# Patient Record
Sex: FEMALE | Race: AMERICAN INDIAN OR ALASKA NATIVE
[De-identification: names, ages, dates, MRNs, and addresses within clinical notes are randomized per-mention and may not be internally consistent; named-entity substitution may affect disease eponyms.]

---

## 2018-03-26 ENCOUNTER — HOSPITAL ENCOUNTER (OUTPATIENT)
Dept: HOSPITAL 56 - MW.SDS | Age: 68
Discharge: HOME | End: 2018-03-26
Attending: SURGERY
Payer: MEDICARE

## 2018-03-26 VITALS — DIASTOLIC BLOOD PRESSURE: 61 MMHG | SYSTOLIC BLOOD PRESSURE: 136 MMHG

## 2018-03-26 DIAGNOSIS — Z88.8: ICD-10-CM

## 2018-03-26 DIAGNOSIS — K21.9: ICD-10-CM

## 2018-03-26 DIAGNOSIS — Z79.899: ICD-10-CM

## 2018-03-26 DIAGNOSIS — K64.8: Primary | ICD-10-CM

## 2018-03-26 DIAGNOSIS — I10: ICD-10-CM

## 2018-03-26 DIAGNOSIS — Z86.010: ICD-10-CM

## 2018-03-26 DIAGNOSIS — Z91.030: ICD-10-CM

## 2018-03-26 DIAGNOSIS — E66.9: ICD-10-CM

## 2018-03-26 PROCEDURE — 45378 DIAGNOSTIC COLONOSCOPY: CPT

## 2018-03-26 NOTE — OR
SURGEON:

Jacobo Spencer M.D.

 

DATE OF PROCEDURE:  03/26/2018

 

OPERATION PERFORMED:

Colonoscopy.

 

ASSISTANT:

Dr. Palacios, PGY3.

 

ANESTHESIA:

MAC.

 

ASA CLASSIFICATION:

III.

 

PREOPERATIVE DIAGNOSES:

1. Intermittent rectal bleeding.

2. Personal history of colon polyps.

 

POSTOPERATIVE DIAGNOSES:

1. No evidence of neoplasia.

2. Internal hemorrhoids.

 

DESCRIPTION OF PROCEDURE:

The patient was taken to the endoscopy room and positioned on the endoscopy

table in the left lateral decubitus position.  Time-out was called for

appropriate identification of the patient and procedure.  Monitored anesthesia

care was provided.  The colonoscope was inserted into the rectum and advanced

with minimal difficulty to the cecum where the colonoscope was retroflexed to

visualize the ascending colon from below.  The colonoscope was then straightened

and slowly withdrawn.  The cecum, ascending colon, hepatic flexure, transverse

colon, splenic flexure, descending colon, sigmoid colon, and rectum were very

well visualized.  No tumors, polyps, diverticula, or angiodysplastic changes

were noted anywhere in the lower gastrointestinal tract.  Once the colonoscope

was withdrawn to the rectum, it was retroflexed to visualize the anal orifice

from above.  No tumors or polyps were seen.  The patient does have internal

hemorrhoids that are not acutely bleeding.

 

Colonoscope was then straightened, the rectum aspirated, and the colonoscope

removed.

 

The patient tolerated the procedure well and was taken to recovery room in

stable condition.

 

 

ADRY ANTHONY

DD:  03/26/2018 12:31:47

DT:  03/26/2018 12:52:30

Job #:  [id number]

## 2018-03-26 NOTE — PCM.PREANE
Preanesthetic Assessment





- Anesthesia/Transfusion/Family Hx


Anesthesia History: Prior Anesthesia Reaction


Other Type of Anesthesia Reaction Comment: Sick, vomit post anesthesia, my 

mother '70's heart stopped w/anesthesia


Family History of Anesthesia Reaction: No


Transfusion History: Prior Transfusion Without Reaction


Intubation History: Unknown





- Review of Systems


General: No Symptoms


Pulmonary: No Symptoms


Cardiovascular: No Symptoms


Gastrointestinal: Other (heme-positive stools)


Neurological: No Symptoms


Other: Reports: None





- Physical Assessment


Height: 1.6 m


Weight: 86.183 kg


ASA Class: 3


Mental Status: Alert & Oriented x3


Airway Class: Mallampati = 2


Dentition: Reports: Edentulous (upper)


Thyro-Mental Finger Breadths: 3


Mouth Opening Finger Breadths: 3


ROM/Head Extension: Full


Lungs: Clear to Auscultation, Normal Respiratory Effort


Cardiovascular: Regular Rate, Regular Rhythm





- Allergies


Allergies/Adverse Reactions: 


 Allergies











Allergy/AdvReac Type Severity Reaction Status Date / Time


 


acetaminophen Allergy  Vomiting Verified 18 14:38





[From Darvocet-N]     


 


codeine Allergy  Vomiting Verified 18 14:38


 


hydrocodone Allergy  Hallucinati Verified 18 14:38





   ons  


 


oxycodone Allergy  Hallucinati Verified 18 14:38





   ons  


 


propoxyphene napsylate Allergy  Vomiting Verified 18 14:38





[From Darvocet-N]     


 


venom-honey bee Allergy  Anaphylactic Verified 18 14:38





[bee venom (honey bee)]   Shock  














- Blood


Blood Available: No





- Anesthesia Plan


Pre-Op Medication Ordered: None





- Acknowledgements


Anesthesia Type Planned: MAC


Pt an Appropriate Candidate for the Planned Anesthesia: Yes


Alternatives and Risks of Anesthesia Discussed w Pt/Guardian: Yes


Pt/Guardian Understands and Agrees with Anesthesia Plan: Yes





PreAnesthesia Questionnaire


HEENT History: Reports: Cataract, Hard of Hearing, Other (See Below)


Other HEENT History: using reading glasses,  has bilateral heariing aides


Cardiovascular History: Reports: High Cholesterol, Hypertension


Gastrointestinal History: Reports: Colon Polyp, Diverticulosis, GERD, Hiatal 

Hernia


Genitourinary History: Reports: Other (See Below)


Other Genitourinary History: HX: UTI's


OB/GYN History: Reports: Pregnancy


Musculoskeletal History: Reports: Fracture


Other Musculoskeletal History: hx of fx left arm and toe,  has left leg pain


Neurological History: Reports: Migraines, Other (See Below)


Other Neuro History: hx of [place] Palsy


Psychiatric History: Reports: Other (See Below)


Other Psychiatric History: Claustrophobia


Endocrine/Metabolic History: Reports: Obesity/BMI 30+, Other (See Below)


Other Endocrine/Metabolic History: took Metformin in the past, was told her Hgb 

A-1c was good and she should stop the meds





- Past Surgical History


HEENT Surgical History: Reports: Cataract Surgery


GI Surgical History: Reports: Appendectomy, Cholecystectomy, Colonoscopy


Female  Surgical History: Reports: Breast Biopsy (bilateral),  Section

, Hysterectomy


Musculoskeletal Surgical History: Reports: Carpal Tunnel, Shoulder Surgery


Other Musculoskeletal Surgeries/Procedures:: hx of right RTCR- denies hardware





- SUBSTANCE USE


Smoking Status *Q: Never Smoker


Recreational Drug Use History: No





- HOME MEDS


Home Medications: 


 Home Meds





Hydrochlorothiazide 12.5 mg PO BID 16 [History]


Metoprolol Succinate [Toprol XL] 50 mg PO BID 16 [History]


Omeprazole 20 mg PO DAILY 16 [History]


Propylene Glycol [Systane Balance] 1 drop EYEBOTH BID PRN 16 [History]


Ranitidine HCl 150 mg PO BID 16 [History]











- CURRENT (IN HOUSE) MEDS


Current Meds: 





 Current Medications





Lactated Ringer's (Ringers, Lactated)  1,000 mls @ 125 mls/hr IV ASDIRECTED [facility]


   Last Admin: 18 10:57 Dose:  125 mls/hr





Discontinued Medications





Lidocaine (Xylocaine-Mpf 2%) Confirm Administered Dose 5 ml .ROUTE .STK-MED ONE


   Stop: 18 07:42


Propofol (Diprivan  20 Ml) Confirm Administered Dose 400 mg .ROUTE .STK-MED ONE


   Stop: 18 07:42

## 2018-03-26 NOTE — PCM.OPNOTE
- General Post-Op/Procedure Note


Date of Surgery/Procedure: 03/26/18


Operative Procedure(s): Colonoscopy


Pre Op Diagnosis: Intermittent rectal bleeding.  Personal history of colon 

polyps.


Post-Op Diagnosis: Internal hemorrhoids.  No evidence of colonic neoplasia.


Anesthesia Technique: MAC (ASA III)


Primary Surgeon: Jacobo Spencer


Assistant: Chun Palacios


Condition: Stable


Free Text/Narrative:: 





Dictation [id number]





CPT CODE 44066

## 2020-06-15 ENCOUNTER — HOSPITAL ENCOUNTER (EMERGENCY)
Dept: HOSPITAL 56 - MW.ED | Age: 70
Discharge: HOME | End: 2020-06-15
Payer: MEDICARE

## 2020-06-15 DIAGNOSIS — Z88.8: ICD-10-CM

## 2020-06-15 DIAGNOSIS — Z90.710: ICD-10-CM

## 2020-06-15 DIAGNOSIS — Z91.030: ICD-10-CM

## 2020-06-15 DIAGNOSIS — Z88.5: ICD-10-CM

## 2020-06-15 DIAGNOSIS — G89.29: ICD-10-CM

## 2020-06-15 DIAGNOSIS — Z90.49: ICD-10-CM

## 2020-06-15 DIAGNOSIS — R10.84: Primary | ICD-10-CM

## 2020-06-15 DIAGNOSIS — I10: ICD-10-CM

## 2020-06-15 DIAGNOSIS — E66.9: ICD-10-CM

## 2020-06-15 DIAGNOSIS — Z79.899: ICD-10-CM

## 2020-06-15 LAB
BUN SERPL-MCNC: 5 MG/DL (ref 7–18)
CHLORIDE SERPL-SCNC: 96 MMOL/L (ref 98–107)
CO2 SERPL-SCNC: 25.2 MMOL/L (ref 21–32)
GLUCOSE SERPL-MCNC: 117 MG/DL (ref 74–106)
LIPASE SERPL-CCNC: 152 U/L (ref 73–393)
POTASSIUM SERPL-SCNC: 3.5 MMOL/L (ref 3.5–5.1)
SODIUM SERPL-SCNC: 132 MMOL/L (ref 136–145)

## 2020-06-15 PROCEDURE — 85025 COMPLETE CBC W/AUTO DIFF WBC: CPT

## 2020-06-15 PROCEDURE — 83690 ASSAY OF LIPASE: CPT

## 2020-06-15 PROCEDURE — 99284 EMERGENCY DEPT VISIT MOD MDM: CPT

## 2020-06-15 PROCEDURE — 96374 THER/PROPH/DIAG INJ IV PUSH: CPT

## 2020-06-15 PROCEDURE — 81003 URINALYSIS AUTO W/O SCOPE: CPT

## 2020-06-15 PROCEDURE — 80053 COMPREHEN METABOLIC PANEL: CPT

## 2020-06-15 PROCEDURE — 51702 INSERT TEMP BLADDER CATH: CPT

## 2020-06-15 PROCEDURE — 96375 TX/PRO/DX INJ NEW DRUG ADDON: CPT

## 2020-06-15 PROCEDURE — 74177 CT ABD & PELVIS W/CONTRAST: CPT

## 2020-06-15 NOTE — CT
CT abdomen and pelvis

 

Technique: Multiple axial sections were obtained from above the dome 

of the diaphragm inferiorly through the pubic symphysis.  Intravenous 

contrast was utilized.  No oral contrast has been given.

 

Comparison: No prior abdominal imaging.

 

Findings: Visualized lung bases shows nothing acute.

 

Heart is enlarged.  Mild coronary artery calcification is seen.

 

Liver contains no focal parenchymal abnormality.  Surgical clips are 

seen from prior cholecystectomy.  Spleen size is normal.  Adrenal 

glands show no nodule.  Pancreas shows no discrete abnormality.  Aorta

 shows no aneurysm.  Kidneys show several scattered small cortical 

cyst.  Right and left ureter show some slight prominence.  There are 2

 equivocal nonobstructing stones within the distal right ureter.  This

 is not a definite finding and findings could represent adjacent 

phlebolith.  Bladder is moderately distended.  The mild areas of 

ureteral distention are most likely due to chronic bladder outlet 

obstruction.

 

Appendix is not definitely visualized.  There is no free fluid or 

inflammatory change being seen.  No bowel dilatation is identified.

 

Bone window settings were reviewed which shows scattered degenerative 

change within the spine.  No acute osseous finding is appreciated.

 

Impression:

1.  Moderately distended bladder.  This is felt to cause some mild 

dilatation of portions of the ureters.  2 questionable nonobstructing 

calculi within the distal right ureter.  This is not a definite 

finding and these calcifications could also represent adjacent 

phleboliths.

2.  Other findings believed to be incidental.  Nothing acute is 

appreciated on CT study of the abdomen and pelvis.

 

Diagnostic code #2

 

This report was dictated in MDT

## 2020-06-15 NOTE — EDM.PDOC
<Juno Beaulieu - Last Filed: 06/15/20 19:56>





ED HPI GENERAL MEDICAL PROBLEM





- General


Chief Complaint: Abdominal Pain


Stated Complaint: ABDOMINAL PAIN


Time Seen by Provider: 06/15/20 16:47





- History of Present Illness


INITIAL COMMENTS - FREE TEXT/NARRATIVE: 


Patient signout at 7 PM.  Patient seen and evaluated by me.  Patient presented 

to the ED today secondary to abdominal discomfort.  Patient reports that she is 

had abdominal discomfort for quite some time and is currently being evaluated.  

She reports that today the pain had increased so came to the ED for further 

evaluation.  Patient's ER evaluation thus far has been unremarkable.  Patient's 

labs are all within normal limits.  Patient CT scan does not reveal any acute 

pathology aside from a distended bladder.  Patient was asked to utilize the 

bathroom in the ED after the CT scan and a postvoid residual Lew catheter was 

inserted.  After voiding, patient had approximately 250 cc of clear urine 

obtained in Lew catheter.  It appears likely that the patient's distended 

bladder was secondary to urine production rather than outflow obstruction.  

Lew catheter will be removed no further necessity for further evaluation for 

distended bladder will be required at this time.





Patient's urinalysis was unremarkable.








Impression/Plan 


Acute exacerbation of chronic abdominal discomfort.  Etiology unclear at this 

time however ER work-up reveals no acute emergent issues.  Patient be 

discharged home safely with instructions to follow-up with her primary care 

physician and with Dr. Spencer for reevaluation.








Reassessment at the time of disposition demonstrates that the patient is in no 

acute distress.  The patient has remained stable throughout the entire ED visit 

and is without objective evidence for acute process requiring urgent 

intervention or hospitalization. The patient is stable for discharge, 

counseling is provided as documented above, discussed symptomatic treatment and 

specific conditions for return.





I have spoken with the patient/caregive and discussed todays findings, in 

addition to providing specific details for the plan of care. Questions are 

answered and there is agreement with the plan.











- Related Data


 Allergies











Allergy/AdvReac Type Severity Reaction Status Date / Time


 


acetaminophen Allergy  Vomiting Verified 06/15/20 16:48





[From Darvocet-N]     


 


codeine Allergy  Vomiting Verified 06/15/20 16:48


 


hydrocodone Allergy  Hallucinati Verified 06/15/20 16:48





   ons  


 


oxycodone Allergy  Hallucinati Verified 06/15/20 16:48





   ons  


 


propoxyphene napsylate Allergy  Vomiting Verified 06/15/20 16:48





[From Darvocet-N]     


 


venom-honey bee Allergy  Anaphylactic Verified 06/15/20 16:48





[bee venom (honey bee)]   Shock  


 


warfarin Allergy  Hives Verified 06/15/20 16:48











Home Meds: 


 Home Meds





Metoprolol Succinate [Toprol XL] 50 mg PO BID 16 [History]


Ciprofloxacin [Cipro XR] 500 mg PO BID 06/15/20 [History]


Docusate Sodium [Colace] 100 mg PO BID 06/15/20 [History]


Omeprazole 20 mg PO BID 06/15/20 [History]


metFORMIN [Glucophage] 500 mg PO BID 06/15/20 [History]


metroNIDAZOLE [Metronidazole] 500 mg PO BID 06/15/20 [History]











Course





- Vital Signs


Last Recorded V/S: 


 Last Vital Signs











Temp  97.4 F   06/15/20 20:17


 


Pulse  85   06/15/20 20:17


 


Resp  18   06/15/20 20:17


 


BP  157/71 H  06/15/20 20:17


 


Pulse Ox  96   06/15/20 20:17














- Orders/Labs/Meds


Orders: 


 Active Orders 24 hr











 Category Date Time Status


 


 Insert Urinary Catheter [OM.PC] Q24H Care  06/15/20 19:00 Ordered


 


 Urinary Catheter Assessment [RC] ASDIRECTED Care  06/15/20 18:53 Active


 


 Saline Lock Insert [OM.PC] Stat Oth  06/15/20 17:05 Ordered











Labs: 


 Laboratory Tests











  06/15/20 06/15/20 06/15/20 Range/Units





  16:56 16:56 19:20 


 


WBC  5.24    (4.0-11.0)  K/uL


 


RBC  4.09 L    (4.30-5.90)  M/uL


 


Hgb  11.5 L    (12.0-16.0)  g/dL


 


Hct  35.0 L    (36.0-46.0)  %


 


MCV  85.6    (80.0-98.0)  fL


 


MCH  28.1    (27.0-32.0)  pg


 


MCHC  32.9    (31.0-37.0)  g/dL


 


RDW Std Deviation  42.1    (28.0-62.0)  fl


 


RDW Coeff of Su  14    (11.0-15.0)  %


 


Plt Count  223    (150-400)  K/uL


 


MPV  10.30    (7.40-12.00)  fL


 


Neut % (Auto)  57.6    (48.0-80.0)  %


 


Lymph % (Auto)  34.0    (16.0-40.0)  %


 


Mono % (Auto)  7.6    (0.0-15.0)  %


 


Eos % (Auto)  0.6    (0.0-7.0)  %


 


Baso % (Auto)  0.2    (0.0-1.5)  %


 


Neut # (Auto)  3.0    (1.4-5.7)  K/uL


 


Lymph # (Auto)  1.8    (0.6-2.4)  K/uL


 


Mono # (Auto)  0.4    (0.0-0.8)  K/uL


 


Eos # (Auto)  0.0    (0.0-0.7)  K/uL


 


Baso # (Auto)  0.0    (0.0-0.1)  K/uL


 


Nucleated RBC %  0.0    /100WBC


 


Nucleated RBCs #  0    K/uL


 


Sodium   132 L   (136-145)  mmol/L


 


Potassium   3.5   (3.5-5.1)  mmol/L


 


Chloride   96 L   ()  mmol/L


 


Carbon Dioxide   25.2   (21.0-32.0)  mmol/L


 


BUN   5 L   (7.0-18.0)  mg/dL


 


Creatinine   0.9   (0.6-1.0)  mg/dL


 


Est Cr Clr Drug Dosing   40.58   mL/min


 


Estimated GFR (MDRD)   > 60.0   ml/min


 


Glucose   117 H   ()  mg/dL


 


Calcium   9.3   (8.5-10.1)  mg/dL


 


Total Bilirubin   0.5   (0.2-1.0)  mg/dL


 


AST   35   (15-37)  IU/L


 


ALT   34   (14-63)  IU/L


 


Alkaline Phosphatase   69   ()  U/L


 


Total Protein   8.3 H   (6.4-8.2)  g/dL


 


Albumin   4.0   (3.4-5.0)  g/dL


 


Globulin   4.3 H   (2.6-4.0)  g/dL


 


Albumin/Globulin Ratio   0.9   (0.9-1.6)  


 


Lipase   152   ()  U/L


 


Urine Color    YELLOW  


 


Urine Appearance    CLEAR  


 


Urine pH    6.0  (5.0-8.0)  


 


Ur Specific Gravity    <= 1.005  (1.001-1.035)  


 


Urine Protein    NEGATIVE  (NEGATIVE)  mg/dL


 


Urine Glucose (UA)    NEGATIVE  (NEGATIVE)  mg/dL


 


Urine Ketones    NEGATIVE  (NEGATIVE)  mg/dL


 


Urine Occult Blood    NEGATIVE  (NEGATIVE)  


 


Urine Nitrite    NEGATIVE  (NEGATIVE)  


 


Urine Bilirubin    NEGATIVE  (NEGATIVE)  


 


Urine Urobilinogen    0.2  (<2.0)  EU/dL


 


Ur Leukocyte Esterase    NEGATIVE  (NEGATIVE)  











Meds: 


Medications














Discontinued Medications














Generic Name Dose Route Start Last Admin





  Trade Name Freq  PRN Reason Stop Dose Admin


 


Al Hydroxide/Mg Hydroxide 15  0 ml  06/15/20 17:05  06/15/20 17:19





  ml/ Lidocaine HCl 5 ml  PO  06/15/20 17:06  1 each





  ONETIME ONE   Administration





     





     





     





     


 


Diphenhydramine HCl  12.5 mg  06/15/20 17:05  06/15/20 17:20





  Benadryl  IVPUSH  06/15/20 17:06  12.5 mg





  ONETIME ONE   Administration





     





     





     





     


 


Iopamidol  50 ml  06/15/20 18:46  06/15/20 18:47





  Isovue-370 (76%)  IV  06/15/20 18:47  50 ml





  ONETIME ONE   Administration





     





     





     





     


 


Metoclopramide HCl  10 mg  06/15/20 17:05  06/15/20 17:20





  Reglan  IVPUSH  06/15/20 17:06  10 mg





  ONETIME ONE   Administration





     





     





     





     


 


Sodium Chloride  10 ml  06/15/20 17:05  06/15/20 17:20





  Saline Flush  FLUSH   10 ml





  ASDIRECTED PRN   Administration





  Keep Vein Open   





     





     





     


 


Sodium Chloride  2.5 ml  06/15/20 17:05  06/15/20 17:20





  Saline Flush  FLUSH   2.5 ml





  ASDIRECTED PRN   Administration





  Keep Vein Open   





     





     





     














Departure





- Departure


Time of Disposition: 19:49


Disposition: Home, Self-Care 01


Condition: Good


Clinical Impression: 


 Abdominal pain








- Discharge Information


*PRESCRIPTION DRUG MONITORING PROGRAM REVIEWED*: Not Applicable


*COPY OF PRESCRIPTION DRUG MONITORING REPORT IN PATIENT DAY: Not Applicable


Instructions:  Abdominal Pain, Adult


Referrals: 


Yareli Clements ARNP [Primary Care Provider] - 


Forms:  ED Department Discharge


Additional Instructions: 


You have been seen and evaluated today in the emergency department for your 

abdominal pain.  The work-up in the emergency department today does not reveal 

any acute or life-threatening cause to your pain.  At this time, you will be 

safely discharged home and will be instructed to follow-up with Dr. Spencer as 

an outpatient for further evaluation.





[facility] - General Surgery


 [address], [address]


[place] [address]


Phone: [phone]








The following information is given to patients seen in the emergency department 

who are being discharged to home. This information is to outline your options 

for follow-up care. We provide all patients seen in our emergency department 

with a follow-up referral.





The need for follow-up, as well as the timing and circumstances, are variable 

depending upon the specifics of your emergency department visit.





If you don't have a primary care physician on staff, we will provide you with a 

referral. We always advise you to contact your personal physician following an 

emergency department visit to inform them of the circumstance of the visit and 

for follow-up with them and/or the need for any referrals to a consulting 

specialist.





The emergency department will also refer you to a specialist when appropriate. 

This referral assures that you have the opportunity for follow-up care with a 

specialist. All of these measure are taken in an effort to provide you with 

optimal care, which includes your follow-up.





Under all circumstances we always encourage you to contact your private 

physician who remains a resource for coordinating your care. When calling for 

follow-up care, please make the office aware that this follow-up is from your 

recent emergency room visit. If for any reason you are refused follow-up, 

please contact the [facility] Emergency 

Department at [phone] and asked to speak to the emergency department 

charge nurse.








Sepsis Event Note (ED)





- Focused Exam


Vital Signs: 


 Vital Signs











  Temp Pulse Resp BP Pulse Ox


 


 06/15/20 20:17  97.4 F  85  18  157/71 H  96


 


 06/15/20 19:25   90   150/61 H  95














- My Orders


Last 24 Hours: 


My Active Orders





06/15/20 17:05


Saline Lock Insert [OM.PC] Stat 





06/15/20 18:53


Urinary Catheter Assessment [RC] ASDIRECTED 





06/15/20 19:00


Insert Urinary Catheter [OM.PC] Q24H 














- Assessment/Plan


Last 24 Hours: 


My Active Orders





06/15/20 17:05


Saline Lock Insert [OM.PC] Stat 





06/15/20 18:53


Urinary Catheter Assessment [RC] ASDIRECTED 





06/15/20 19:00


Insert Urinary Catheter [OM.PC] Q24H 














<Inga Valiente - Last Filed: 20 07:29>





ED HPI GENERAL MEDICAL PROBLEM





- History of Present Illness


INITIAL COMMENTS - FREE TEXT/NARRATIVE: 





History of present illness:


70-year-old female presenting with chronic abdominal pain, located diffusely in 

the abdomen which she reports has worsened in the last few days.  Apparently 

the patient has had outpatient CT and upper GI endoscopy in the last 2 weeks.  

The EGD was negative though she had esophageal dilation performed by the 

gastroenterologist Dr. Betsey Carter.  The CT scan showed diverticulosis 

without diverticulitis or other acute etiology for the abdominal pain.  Patient 

does report that the pain worsened significantly yesterday and today to the 

point that she was unable to control it.  Apparently she went to the ER closer 

to her home yesterday, last night and they gave her some medications but did 

not do any imaging tests.  She also went to her primary care office today where 

they evaluated her.  She reports that she was started on Cipro/Flagyl 2 days 

ago but she is not certain for what reason.  She is here requesting to see Dr. Spencer, the surgeon who performed her colonoscopy 2 years ago.


She also reports that he is no longer taking her metformin because someone in 

[place] told her she did not need to take it anymore.





Review of systems: 


As per history of present illness and below otherwise all systems reviewed and 

negative.





Past medical history: 


As per history of present illness and as reviewed below otherwise 

noncontributory.





Surgical history: 


As per history of present illness and as reviewed below otherwise 

noncontributory.





Social history: 


No reported history of drug or alcohol abuse.  Denies tobacco.





Family history: 


As per history of present illness and as reviewed below otherwise 

noncontributory.





Physical exam:


GEN: no acute distress, well appearing


HEENT: Atraumatic, normocephalic, mucous membranes moist, 


Neck: supple, nontender, trachea midline.


Lungs: No respiratory distress.


Heart: RRR


Abdomen: Soft, nondistended, diffusely mildly tender, greater on the left side. 


Back: nontender


Extremities: Atraumatic. Neurovascularly intact.


Neuro: Awake, alert, somewhat unable to give PMH and appears somewhat confused 

about past workup/PMH (told me she was told she didn't need metformin for her DM

). Neuro Exam nonfocal.


Skin: warm, dry, no lesions





Diagnostics:


[]





Therapeutics:


[]





MDM: 





Impression: 


[]





Plan:


[]





Definitive disposition and diagnosis as appropriate pending reevaluation and 

review of above.








  ** abdomen


Pain Score (Numeric/FACES): 5





Past Medical History


HEENT History: Reports: Cataract, Hard of Hearing, Other (See Below)


Other HEENT History: using reading glasses,  has bilateral heariing aides


Cardiovascular History: Reports: High Cholesterol, Hypertension


Respiratory History: Reports: None


Gastrointestinal History: Reports: Colon Polyp, Diverticulosis, GERD, Hiatal 

Hernia


Genitourinary History: Reports: Other (See Below)


Other Genitourinary History: HX: UTI's


OB/GYN History: Reports: Pregnancy


Musculoskeletal History: Reports: Fracture


Other Musculoskeletal History: hx of fx left arm and toe,  has left leg pain


Neurological History: Reports: Migraines, Other (See Below)


Other Neuro History: hx of Leonidas Palsy


Psychiatric History: Reports: Other (See Below)


Other Psychiatric History: Claustrophobia


Endocrine/Metabolic History: Reports: Obesity/BMI 30+, Other (See Below)


Other Endocrine/Metabolic History: took Metformin in the past, was told her Hgb 

A-1c was good and she should stop the meds


Immunologic History: Reports: None


Oncologic (Cancer) History: Reports: None


Dermatologic History: Reports: None





- Infectious Disease History


Infectious Disease History: Reports: None





- Past Surgical History


Head Surgeries/Procedures: Reports: None


HEENT Surgical History: Reports: Cataract Surgery


Cardiovascular Surgical History: Reports: None


Respiratory Surgical History: Reports: None


GI Surgical History: Reports: Appendectomy, Cholecystectomy, Colonoscopy


Female  Surgical History: Reports: Breast Biopsy,  Section, 

Hysterectomy


Musculoskeletal Surgical History: Reports: Carpal Tunnel, Shoulder Surgery


Other Musculoskeletal Surgeries/Procedures:: hx of right RTCR- denies hardware


Dermatological Surgical History: Reports: None





Social & Family History





- Family History


Family Medical History: Unobtainable





- Tobacco Use


Smoking Status *Q: Never Smoker





- Caffeine Use


Caffeine Use: Reports: None





- Recreational Drug Use


Recreational Drug Use: No





ED ROS GENERAL





- Review of Systems


Review Of Systems: See Below (See dictation)





ED EXAM, GI/ABD





- Physical Exam


Exam: See Below (See dictation)





Course





- Vital Signs


Text/Narrative:: 





Patient requesting to be seen by Dr. Spencer in the emergency department.  I 

discussed with her that she would likely be able to follow-up with him 

outpatient, but is not currently on call and she would not need surgical 

intervention unless there was an acute surgical emergency.  She does report 

that her pain has worsened over the last few days in the interim since having a 

CT scan 2 weeks ago.  Therefore CT scan will be repeated.  We will also check 

labs.


She does have history of diabetes and may have some gastroparesis contributing 

to her symptoms and therefore Reglan was given.





Signed out to Dr. Beaulieu at 7 PM to follow-up on imaging and reassess patient 

and make final disposition plan.





- Orders/Labs/Meds


Labs: 


 Laboratory Tests











  06/15/20 06/15/20 06/15/20 Range/Units





  16:56 16:56 19:20 


 


WBC  5.24    (4.0-11.0)  K/uL


 


RBC  4.09 L    (4.30-5.90)  M/uL


 


Hgb  11.5 L    (12.0-16.0)  g/dL


 


Hct  35.0 L    (36.0-46.0)  %


 


MCV  85.6    (80.0-98.0)  fL


 


MCH  28.1    (27.0-32.0)  pg


 


MCHC  32.9    (31.0-37.0)  g/dL


 


RDW Std Deviation  42.1    (28.0-62.0)  fl


 


RDW Coeff of Su  14    (11.0-15.0)  %


 


Plt Count  223    (150-400)  K/uL


 


MPV  10.30    (7.40-12.00)  fL


 


Neut % (Auto)  57.6    (48.0-80.0)  %


 


Lymph % (Auto)  34.0    (16.0-40.0)  %


 


Mono % (Auto)  7.6    (0.0-15.0)  %


 


Eos % (Auto)  0.6    (0.0-7.0)  %


 


Baso % (Auto)  0.2    (0.0-1.5)  %


 


Neut # (Auto)  3.0    (1.4-5.7)  K/uL


 


Lymph # (Auto)  1.8    (0.6-2.4)  K/uL


 


Mono # (Auto)  0.4    (0.0-0.8)  K/uL


 


Eos # (Auto)  0.0    (0.0-0.7)  K/uL


 


Baso # (Auto)  0.0    (0.0-0.1)  K/uL


 


Nucleated RBC %  0.0    /100WBC


 


Nucleated RBCs #  0    K/uL


 


Sodium   132 L   (136-145)  mmol/L


 


Potassium   3.5   (3.5-5.1)  mmol/L


 


Chloride   96 L   ()  mmol/L


 


Carbon Dioxide   25.2   (21.0-32.0)  mmol/L


 


BUN   5 L   (7.0-18.0)  mg/dL


 


Creatinine   0.9   (0.6-1.0)  mg/dL


 


Est Cr Clr Drug Dosing   40.58   mL/min


 


Estimated GFR (MDRD)   > 60.0   ml/min


 


Glucose   117 H   ()  mg/dL


 


Calcium   9.3   (8.5-10.1)  mg/dL


 


Total Bilirubin   0.5   (0.2-1.0)  mg/dL


 


AST   35   (15-37)  IU/L


 


ALT   34   (14-63)  IU/L


 


Alkaline Phosphatase   69   ()  U/L


 


Total Protein   8.3 H   (6.4-8.2)  g/dL


 


Albumin   4.0   (3.4-5.0)  g/dL


 


Globulin   4.3 H   (2.6-4.0)  g/dL


 


Albumin/Globulin Ratio   0.9   (0.9-1.6)  


 


Lipase   152   ()  U/L


 


Urine Color    YELLOW  


 


Urine Appearance    CLEAR  


 


Urine pH    6.0  (5.0-8.0)  


 


Ur Specific Gravity    <= 1.005  (1.001-1.035)  


 


Urine Protein    NEGATIVE  (NEGATIVE)  mg/dL


 


Urine Glucose (UA)    NEGATIVE  (NEGATIVE)  mg/dL


 


Urine Ketones    NEGATIVE  (NEGATIVE)  mg/dL


 


Urine Occult Blood    NEGATIVE  (NEGATIVE)  


 


Urine Nitrite    NEGATIVE  (NEGATIVE)  


 


Urine Bilirubin    NEGATIVE  (NEGATIVE)  


 


Urine Urobilinogen    0.2  (<2.0)  EU/dL


 


Ur Leukocyte Esterase    NEGATIVE  (NEGATIVE)  














Sepsis Event Note (ED)





- Evaluation


Sepsis Screening Result: No Definite Risk

## 2020-06-16 VITALS — HEART RATE: 85 BPM | SYSTOLIC BLOOD PRESSURE: 157 MMHG | DIASTOLIC BLOOD PRESSURE: 71 MMHG
